# Patient Record
Sex: MALE | Race: WHITE | URBAN - METROPOLITAN AREA
[De-identification: names, ages, dates, MRNs, and addresses within clinical notes are randomized per-mention and may not be internally consistent; named-entity substitution may affect disease eponyms.]

---

## 2017-09-07 ENCOUNTER — OFFICE VISIT (OUTPATIENT)
Dept: PEDIATRICS | Facility: CLINIC | Age: 22
End: 2017-09-07
Payer: COMMERCIAL

## 2017-09-07 VITALS
HEART RATE: 78 BPM | TEMPERATURE: 98.3 F | SYSTOLIC BLOOD PRESSURE: 110 MMHG | HEIGHT: 69 IN | DIASTOLIC BLOOD PRESSURE: 70 MMHG | BODY MASS INDEX: 27.58 KG/M2 | OXYGEN SATURATION: 98 % | WEIGHT: 186.2 LBS

## 2017-09-07 DIAGNOSIS — H57.12 EYE PAIN, LEFT: Primary | ICD-10-CM

## 2017-09-07 PROCEDURE — 99213 OFFICE O/P EST LOW 20 MIN: CPT | Mod: GC | Performed by: PEDIATRICS

## 2017-09-07 NOTE — NURSING NOTE
"Chief Complaint   Patient presents with     Eye Problem     possible sty left eye       Initial /70 (BP Location: Right arm, Patient Position: Chair, Cuff Size: Adult Regular)  Pulse 78  Temp 98.3  F (36.8  C) (Oral)  Ht 5' 9\" (1.753 m)  Wt 186 lb 3.2 oz (84.5 kg)  SpO2 98%  BMI 27.5 kg/m2 Estimated body mass index is 27.5 kg/(m^2) as calculated from the following:    Height as of this encounter: 5' 9\" (1.753 m).    Weight as of this encounter: 186 lb 3.2 oz (84.5 kg).  Medication Reconciliation: complete   Martina Montano MA      "

## 2017-09-07 NOTE — PATIENT INSTRUCTIONS
Thank you for coming in to clinic today. It was a pleasure to meet you. I am currently building my patient panel and would be happy to see you back in clinic. I currently see patients on Thursday mornings and afternoons.     Sarah Recio MD  Internal Medicine-Pediatrics Resident  For appointments: [phone]      Doctor's Instructions:   Please start warm compresses 15 minutes x3 times daily for the next week.    If not improved in the next seven days please see ophthalmology.     [facility] - [place]  [phone]  [email or web address]  [facility] VICTORIANO Platt [phone]   [email or web address]

## 2017-09-07 NOTE — PROGRESS NOTES
"  SUBJECTIVE:   Orlin Valerio is a 22 year old male who presents to clinic today for the following health issues:      Eye(s) Problem      Duration: 4 days ago     Description:  Location: left  Pain: YES- with touch   Redness: no   Discharge: no     Accompanying signs and symptoms: none    History (Trauma, foreign body exposure,): None    Precipitating or alleviating factors (contact use): touching makes it worse     Therapies tried and outcome: None      Orlin Valerio is an otherwise healthy 23 yo male who presents with left sided upper medial eye lid pain. The patient is worried about a stye but denies any swelling, redness or discharge. He does not have any vision changes. No trauma or foreign body exposure. Has not tried anything for his symptoms and overall it has improved over the last couple of days. Basically he only has symptoms if he rubs the area.     Problem list and histories reviewed & adjusted, as indicated.  Additional history: as documented    Patient Active Problem List   Diagnosis     Ganglion cyst     Other infective acute otitis externa of right ear     ADHD (attention deficit hyperactivity disorder)     Past Surgical History:   Procedure Laterality Date     HERNIA REPAIR      as an infant       Social History   Substance Use Topics     Smoking status: Former Smoker     Packs/day: 0.00     Types: Cigarettes     Smokeless tobacco: Never Used     Alcohol use Yes      Comment: states \"not very often\"     Family History   Problem Relation Age of Onset     Other - See Comments Other      ganglion         No current outpatient prescriptions on file.     Allergies   Allergen Reactions     Pollen Extract        ROS:  A 7 point ROS is negative other than the symptoms noted above in the HPI.\    OBJECTIVE:     /70 (BP Location: Right arm, Patient Position: Chair, Cuff Size: Adult Regular)  Pulse 78  Temp 98.3  F (36.8  C) (Oral)  Ht 5' 9\" (1.753 m)  Wt 186 lb 3.2 oz (84.5 kg)  SpO2 98%  " BMI 27.5 kg/m2  Body mass index is 27.5 kg/(m^2).    Exam:  General: the patient is pleasant, resting comfortably, no acute distress.   HEENT: Normocephalic, atraumatic. PERRL. EOMI. No conjunctivitis or scleral icterus. No eye lid swelling or redness. No obvious foreign body. No facial rashes. MMM. No nasal discharge.   Lungs: Breathing comfortably on room air.   Skin: no appreciable skin lesions/rashes on exposed skin.   Neuro: Alert and oriented x4, grossly normal neurologic exam.     Diagnostic Test Results:  none     ASSESSMENT/PLAN:   1. Eye pain, left  Upper eyelid pain, medial margin. Patient able to palpate a small area of fullness, but I am not able to. On exam no evidence of stye, his eye and lid look completely normal. No signs of infection. Could be a small duct dilation, but no overt pathology.     - Warm compresses three times daily and monitor symptoms    - If pain ongoing over the next week will have follow up with ophthalmology, contact information given    - Discussed concerning signs and symptoms to suggest infection       Follow up: as needed based on symptoms    The above plan was discussed with Dr. Cardoza as documented above.    Sarah Recio MD  Med-Peds Resident PGY-4  [facility]UEL [facility]EL    I have seen this patient and examined him in the presence of Dr. Recio.  I was present during the key components of the presenting complaints, physical exam, diagnosis, and plan, and fully concur with the plan as listed in the resident's note.